# Patient Record
Sex: FEMALE | Race: WHITE
[De-identification: names, ages, dates, MRNs, and addresses within clinical notes are randomized per-mention and may not be internally consistent; named-entity substitution may affect disease eponyms.]

---

## 2017-03-20 ENCOUNTER — HOSPITAL ENCOUNTER (EMERGENCY)
Dept: HOSPITAL 25 - ED | Age: 52
LOS: 1 days | Discharge: HOME | End: 2017-03-21
Payer: COMMERCIAL

## 2017-03-20 DIAGNOSIS — H53.149: ICD-10-CM

## 2017-03-20 DIAGNOSIS — R51: Primary | ICD-10-CM

## 2017-03-20 DIAGNOSIS — F17.210: ICD-10-CM

## 2017-03-20 PROCEDURE — 96374 THER/PROPH/DIAG INJ IV PUSH: CPT

## 2017-03-20 PROCEDURE — 99282 EMERGENCY DEPT VISIT SF MDM: CPT

## 2017-03-20 PROCEDURE — 70450 CT HEAD/BRAIN W/O DYE: CPT

## 2017-03-21 VITALS — SYSTOLIC BLOOD PRESSURE: 132 MMHG | DIASTOLIC BLOOD PRESSURE: 77 MMHG

## 2017-03-21 NOTE — RAD
HISTORY: Headache, status post stroke



COMPARISONS: October 17, 2016



TECHNIQUE: Multiple contiguous axial CT scans were obtained of the head without 

intravenous contrast. 



FINDINGS: 

HEMORRHAGE/INFARCT: There is no hemorrhage or acute infarct.

MASSES/SHIFT: There is no mass or shift.



EXTRA-AXIAL SPACES: There are no extra-axial fluid collections.

SULCI AND VENTRICLES: The sulci and ventricles are normal in size and position for the

patient's stated age.



CEREBRUM: There is right frontal encephalomalacia consistent with remote infarct.

BRAINSTEM: There are no focal parenchymal abnormalities.

CEREBELLUM: There are no focal parenchymal abnormalities.



VESSELS: The vessels are grossly normal.

PARANASAL SINUSES: The paranasal sinuses are clear.

ORBITS: The orbits are unremarkable.

BONES AND SOFT TISSUE: No bone or soft tissue abnormalities are noted.



OTHER: None



IMPRESSION: 

NO ACUTE INTRACRANIAL PATHOLOGY. REMOTE INFARCT OF THE RIGHT FRONTAL LOBE .

## 2017-03-21 NOTE — ED
I, Oh,Kash, scribed for Mohsen Salvador MD on 03/21/17 at 0022 .





Headache





- HPI Summary


HPI Summary: 


This 50 y/o female presents to ED for acute on chronic HA since today AM. PMHx 

includes CVA in September 2016, and pt states that she has had similar HA since 

then. She decided to visit ED today when HA became worse and made driving and 

ambulation difficult. Pt does have an appointment set up with her neurologist 

Dr. Agarwal today. 





Plan of care involving CT imaging and outpatient f/u with Dr. Agarwal as 

scheduled is discussed with pt and family members are agreeable. 








- History Of Current Complaint


Chief Complaint: EDHeadache


Stated Complaint: HEAD PAIN


Time Seen by Provider: 03/20/17 23:23


Hx Obtained From: Patient, Medical Records


Onset/Duration: Gradual Onset, Still Present


Initially Headache Was: Moderate


Currently Pain Is: Moderate


Timing: Constant


Character: Dull


Location of Headache: Diffuse


Aggravating Factor: Bright Lights


Allevating Factors: Nothing


Associated Signs And Symptoms: Negative





- Allergies/Home Medications


Allergies/Adverse Reactions: 


 Allergies











Allergy/AdvReac Type Severity Reaction Status Date / Time


 


Hydrocodone [From Vicodin] Allergy Intermediate Nausea And Verified 03/20/17 23:

07





   Vomiting  


 


Ibuprofen Allergy Intermediate Nausea And Verified 03/20/17 23:07





   Vomiting  


 


Latex Allergy Intermediate Rash Verified 03/20/17 23:07


 


bee stings Allergy Severe Swelling Uncoded 03/20/17 23:07





   Of  





   Face,Lips,&  





   Throat  


 


seafood Allergy Severe Difficulty Uncoded 03/20/17 23:07





   Breathing  


 


cocoanut Allergy Intermediate Rash Uncoded 03/20/17 23:07














PMH/Surg Hx/FS Hx/Imm Hx


Endocrine/Hematology History: 


   Denies: Hx Anticoagulant Therapy, Hx Diabetes, Hx Thyroid Disease


Cardiovascular History: Reports: Hx Deep Vein Thrombosis - left leg, Hx 

Hypercholesterolemia, Hx Hypertension, Other Cardiovascular Problems/Disorders 

- dyslipidemia, right sided carotid artery stenosis, patent rahman oval


   Denies: Hx Pacemaker/ICD


Respiratory History: Reports: Hx Asthma, Hx Chronic Obstructive Pulmonary 

Disease (COPD)


 History: Reports: Hx Acute Renal Failure


   Denies: Hx Renal Disease


Musculoskeletal History: Reports: Hx Back Problems


Sensory History: 


   Denies: Hx Contacts or Glasses, Hx Hearing Aid


Opthamlomology History: 


   Denies: Hx Contacts or Glasses


Neurological History: Reports: Hx Migraine


   Denies: Hx Dementia, Hx Seizures, Other Neuro Impairments/Disorders - DENIES


Psychiatric History: Reports: Hx Depression


   Denies: Hx Anxiety, Hx Attention Deficit Hyperactivity Disorder, Hx Eating 

Disorder, Hx Panic Disorder, Hx Post Traumatic Stress Disorder, Hx Inpatient 

Treatment, Hx Community Mental Health Tx, Hx Schizophrenia, Hx Bipolar Disorder

, Hx Suicide Attempt - denies SA; says recent OD was a "1x thing" to get the 

attention of wife, Hx of Violent Episodes Against Others, Hx Substance Abuse, 

Other Psychiatric Issues/Disorders





- Surgical History


Surgery Procedure, Year, and Place: [facility]


Infectious Disease History: No


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 

and breast


   Denies: Hx Hepatitis, Hx Human Immunodeficiency Virus (HIV), History Other 

Infectious Disease, Traveled Outside the [place] in Last 30 Days





- Family History


Known Family History: Positive: Cardiac Disease - CHF - MOM AND DAD, Other - 

Father CVA


Family History: FHx of CVA





- Social History


Alcohol Use: None


Hx Substance Use: No


Substance Use Type: Reports: None, Other


Substance Use Comment - Amount & Last Used: cigarettes; trying to quit


Hx Tobacco Use: Yes


Smoking Status (MU): Heavy Every Day Tobacco Smoker


Type: Cigarettes


Amount Used/How Often: 1 ppd or less


Length of Time of Smoking/Using Tobacco: 37 years


Have You Smoked in the Last Year: Yes





Review of Systems


Negative: Fever


Positive: Photophobia


Positive: Headache


Negative: Depressed


All Other Systems Reviewed And Are Negative: Yes





Physical Exam


Triage Information Reviewed: Yes


Vital Signs On Initial Exam: 


 Initial Vitals











Temp Pulse Resp BP Pulse Ox


 


 97.0 F   77   15   141/89   99 


 


 03/20/17 23:00  03/20/17 23:00  03/20/17 23:00  03/20/17 23:00  03/20/17 23:00











Vital Signs Reviewed: Yes


Appearance: Positive: Well-Appearing, Pain Distress - MILD DISCOMFORT


Skin: Positive: Warm


Head/Face: Positive: Normal Head/Face Inspection


Eyes: Positive: EOMI, HANANE


ENT: Positive: Hearing grossly normal


Neck: Positive: Supple


Respiratory/Lung Sounds: Positive: Breath Sounds Present


Cardiovascular: Positive: RRR


Abdomen Description: Positive: Nontender, Soft


Musculoskeletal: Positive: Strength/ROM Intact


Psychiatric: Positive: Affect/Mood Appropriate





Diagnostics





- Vital Signs


 Vital Signs











  Temp Pulse Resp BP Pulse Ox


 


 03/20/17 23:00  97.0 F  77  15  141/89  99














- Laboratory


Lab Statement: Any lab studies that have been ordered have been reviewed, and 

results considered in the medical decision making process.





- CT


  ** Brain


CT Interpretation: No Acute Changes - Old right frontal infarct again noted. 

The remiander of the brain is normal. No hemorrahge. No visible acute infarct. 

No mass. Osseous structures are intact


CT Interpretation Completed By: Radiologist





Re-Evaluation





- Re-Evaluation


  ** First Eval


Change: Improved - RESULTS d/w pt





Headache Course/Dx





- Diagnoses


Provider Diagnoses: 


 Headache








Discharge





- Discharge Plan


Condition: Improved


Disposition: HOME


Patient Education Materials:  General Headache (ED)


Referrals: 


Crystal Veronica MD [Primary Care Provider] - 


Carlos Agarwal MD [Medical Doctor] - 03/21/17 (Please follow up with Dr. Agarwal's office as scheduled. )





The documentation as recorded by the Juan linares Soohyun accurately reflects the 

service I personally performed and the decisions made by me, Mohsen Salvador MD.

## 2017-10-13 ENCOUNTER — HOSPITAL ENCOUNTER (EMERGENCY)
Dept: HOSPITAL 25 - UCCORT | Age: 52
Discharge: HOME | End: 2017-10-13
Payer: SELF-PAY

## 2017-10-13 VITALS — DIASTOLIC BLOOD PRESSURE: 73 MMHG | SYSTOLIC BLOOD PRESSURE: 125 MMHG

## 2017-10-13 DIAGNOSIS — Z91.81: ICD-10-CM

## 2017-10-13 DIAGNOSIS — Z88.6: ICD-10-CM

## 2017-10-13 DIAGNOSIS — Z91.013: ICD-10-CM

## 2017-10-13 DIAGNOSIS — Z91.030: ICD-10-CM

## 2017-10-13 DIAGNOSIS — F17.210: ICD-10-CM

## 2017-10-13 DIAGNOSIS — S73.101A: Primary | ICD-10-CM
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
## 2017-10-13 DIAGNOSIS — Z91.040: ICD-10-CM

## 2017-10-13 DIAGNOSIS — Z88.5: ICD-10-CM

## 2017-10-13 PROCEDURE — G0463 HOSPITAL OUTPT CLINIC VISIT: HCPCS

## 2017-10-13 PROCEDURE — 99212 OFFICE O/P EST SF 10 MIN: CPT

## 2017-10-13 NOTE — UC
Hip/Pelvis Pain





- HPI Summary


HPI Summary: 





RIGHT HIP PAIN X 1WK. RADIATES TO (ANTERIOR) THIGH. HAS BEEN FALLING X 3 , WITH 

INJURY, BUT NO KNOWN INITIAL INJURY. EACH TIME HIP PAIN GETS WORSE. 





- History Of Current Complaint


Chief Complaint: UCLowerExtremity


Stated Complaint: RIGHT HIP AND LEG PAIN


Time Seen by Provider: 10/13/17 17:22


Hx Obtained From: Patient


Hx Last Menstrual Period: April 15


Onset/Duration: Gradual Onset, Lasting Days


Timing: Intermittent Episodes Lasting:


Severity Initially: Mild


Severity Currently: Moderate


Location: Discrete At: - RIGHT HIP


Character Of Pain: Dull, Aching, Spasmodic


Aggravating Factor(s): Movement, Weight Bearing


Alleviating Factor(s): Rest, Position





- Allergies/Home Medications


Allergies/Adverse Reactions: 


 Allergies











Allergy/AdvReac Type Severity Reaction Status Date / Time


 


Hydrocodone [From Vicodin] Allergy Intermediate Nausea And Verified 10/13/17 17:

25





   Vomiting  


 


Ibuprofen Allergy Intermediate Nausea And Verified 10/13/17 17:25





   Vomiting  


 


Latex Allergy Intermediate Rash Verified 10/13/17 17:25


 


bee stings Allergy Severe Swelling Uncoded 10/13/17 17:25





   Of  





   Face,Lips,&  





   Throat  


 


seafood Allergy Severe Difficulty Uncoded 10/13/17 17:25





   Breathing  


 


cocoanut Allergy Intermediate Rash Uncoded 10/13/17 17:25














PMH/Surg Hx/FS Hx/Imm Hx


Previously Healthy: Yes


Other History Of: 


   Negative For: Anticoagulant Therapy





- Surgical History


Surgical History: Yes


Surgery Procedure, Year, and Place: CARODED ARTERY  SURGERY





- Family History


Known Family History: Positive: Cardiac Disease - CHF - MOM AND DAD, Other - 

Father CVA


Family History: FHx of CVA





- Social History


Alcohol Use: None


Substance Use Type: None


Substance Use Comment - Amount & Last Used: cigarettes; trying to quit


Smoking Status (MU): Light Every Day Tobacco Smoker


Type: Cigarettes


Amount Used/How Often: 3 CIGS A DAY


Length of Time of Smoking/Using Tobacco: 37 years


Have You Smoked in the Last Year: Yes


Household Exposure Type: Cigarettes





- Immunization History


Most Recent Influenza Vaccination: not this season


Most Recent Tetanus Shot: utd


Most Recent Pneumonia Vaccination: 2014





Review of Systems


Constitutional: Negative


Skin: Negative


Eyes: Negative


ENT: Negative


Respiratory: Negative


Cardiovascular: Negative


Gastrointestinal: Negative


Genitourinary: Negative


Motor: Negative


Neurovascular: Negative


Musculoskeletal: Arthralgia, Myalgia


Neurological: Negative


Psychological: Negative


Is Patient Immunocompromised?: No


All Other Systems Reviewed And Are Negative: Yes





Physical Exam


Triage Information Reviewed: Yes


Appearance: Well-Appearing, No Pain Distress, Well-Nourished


Vital Signs: 


 Initial Vital Signs











Temp  97.7 F   10/13/17 17:28


 


Pulse  88   10/13/17 17:28


 


Resp  20   10/13/17 17:28


 


BP  125/73   10/13/17 17:28


 


Pulse Ox  100   10/13/17 17:28











Vital Signs Reviewed: Yes


Eye Exam: Normal


ENT Exam: Normal


Dental Exam: Normal


Neck exam: Normal


Neck: Positive: Supple, Nontender, No Lymphadenopathy


Respiratory Exam: Normal


Respiratory: Positive: Chest non-tender, Lungs clear, Normal breath sounds, No 

respiratory distress, No accessory muscle use


Cardiovascular Exam: Normal


Cardiovascular: Positive: RRR, No Murmur, Pulses Normal, Brisk Capillary Refill


Abdominal Exam: Normal


Musculoskeletal: Positive: Strength Intact, ROM Intact, No Edema, Other: - 

tender to palpation of superior trochanter right hip


Neurological Exam: Normal


Psychological Exam: Normal


Skin Exam: Normal





Hip Injury Course/Dx





- Differential Dx/Diagnosis


Differential Diagnosis/HQI/PQRI: Bursitis, Fracture, Sprain, Strain


Provider Diagnoses: RIGHT HIP SPRAIN





Discharge





- Discharge Plan


Condition: Stable


Disposition: HOME


Prescriptions: 


Metaxalone TAB* [Skelaxin TAB*] 800 mg PO TID PRN #15 tab


 PRN Reason: Spasms


traMADol TAB* [Ultram*] 50 mg PO Q8H PRN #15 tab MDD three tabs


 PRN Reason: Pain


Patient Education Materials:  Hip Sprain (ED)


Referrals: 


[facility] [place] [facility] [Outside]


Non Staff,Doctor [Primary Care Provider] - 


Crystal Veronica MD [Medical Doctor] - 


Additional Instructions: 


PHYSICAL THERAPY REFERRAL:


You have been prescribed physical therapy. Treatments may include stretching, 

exercise, application of heat or cold, and other modalities. After an injury, 

PT can reduce swelling and pain. In recovery, PT is used to restore mobility 

and strength. Your specific treatment goals are:


_____ Reduction of Swelling (EGS, US, ice as needed)


___x__ Pain Reduction (EGS, US, ice as needed)


___x__ TENS Pack Fitting and Instruction


_____ Wound Hydrotherapy


__x___ Preservation of Mobility


___x__ Restoration of Mobility


___x__ Strength Restoration


___x__ Work or Sports Hardening


This instruction sheet also serves as your PHYSICAL THERAPY REFERRAL! 


Please take it with you to the therapist, so he/she will be aware of your 

diagnosis and treatment plan.


You may see the physical therapist of your choice for these treatments, but may 

wish to check with your insurance to be sure the provider you select is covered.


It's important to see the doctor to whom you have been referred for follow up.

## 2017-10-13 NOTE — RAD
Indication: RIGHT lateral hip pain for one week which radiates to the medial thigh.

Multiple falls this week. Difficulty weightbearing.



Comparison: August 12, 2016



Technique: Supine AP pelvis and AP and frog-leg lateral views RIGHT hip.



Report: The RIGHT hip is normally located. No fracture or stress reaction evident.

Preserved hip joint spaces. Negative for pelvic fracture or joint diastases. Unremarkable

soft tissue contours accounting for obese body habitus. Inferior vena cava filter

partially visualized extending beyond the cephalad margin of the field-of-view.



IMPRESSION: No radiographic abnormality of the RIGHT hip evident.

## 2017-11-06 ENCOUNTER — HOSPITAL ENCOUNTER (EMERGENCY)
Dept: HOSPITAL 25 - UCEAST | Age: 52
Discharge: HOME | End: 2017-11-06
Payer: MEDICAID

## 2017-11-06 VITALS — DIASTOLIC BLOOD PRESSURE: 74 MMHG | SYSTOLIC BLOOD PRESSURE: 142 MMHG

## 2017-11-06 DIAGNOSIS — F17.210: ICD-10-CM

## 2017-11-06 DIAGNOSIS — I10: ICD-10-CM

## 2017-11-06 DIAGNOSIS — Z88.6: ICD-10-CM

## 2017-11-06 DIAGNOSIS — Z79.01: ICD-10-CM

## 2017-11-06 DIAGNOSIS — J45.909: ICD-10-CM

## 2017-11-06 DIAGNOSIS — Z88.5: ICD-10-CM
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
## 2017-11-06 DIAGNOSIS — S60.351A: Primary | ICD-10-CM

## 2017-11-06 DIAGNOSIS — Z23: ICD-10-CM

## 2017-11-06 PROCEDURE — 90715 TDAP VACCINE 7 YRS/> IM: CPT

## 2017-11-06 PROCEDURE — 90471 IMMUNIZATION ADMIN: CPT

## 2017-11-06 PROCEDURE — 99212 OFFICE O/P EST SF 10 MIN: CPT

## 2017-11-06 PROCEDURE — G0463 HOSPITAL OUTPT CLINIC VISIT: HCPCS

## 2017-11-06 NOTE — RAD
INDICATION:  Foreign body right thumb.



TECHNIQUE: 3 views of the right thumb were obtained.



FINDINGS:  The bones are in normal alignment. No fracture is seen. There is a linear

metallic foreign body which projects over the soft tissues at the level of the

interphalangeal joint of the thumb along the medial aspect measuring 12 mm in length by 1

mm in size with a bend in the middle.



IMPRESSION:  METALLIC FOREIGN BODY AS DESCRIBED.

## 2017-11-06 NOTE — UC
Valerie NOEL Edward, scribed for Russell Morrow MD on 11/06/17 at 1558 .





Hand/Wrist HPI





- HPI Summary


HPI Summary: 





51 y/o female presents to [facility] c/o immediate onset R hand pain and possible FB 

in R hand s/p hand injury yesterday. The pain and site of injury is located @ 

the R thumb and is aggravated with movement of the hand. The pain is rated 7/10 

described as an ache at triage. Yesterday the pt slipped off a step and her 

hand slid down a wooden rail. Pt states she pulled out a small splinter today 

and is worried there might still be something in her R hand. PMHx HTN, asthma. 

Smoker. Sx carotid artery. FHx - mom had COPD and CHF.





- History Of Current Complaint


Chief Complaint: UCForeignBody


Stated Complaint: F.O. IN THUMB


Time Seen by Provider: 11/06/17 15:51


Hx Obtained From: Patient


Hx Last Menstrual Period: April 15


Onset/Duration: Sudden Onset, Lasting Days, Still Present


Severity Currently: Severe


Aggravating Factor(s): Movement - of R hand


Alleviating Factor(s): Nothing


Associated Signs And Symptoms: Positive: Other - FB in R hand @ R thumb





- Allergies/Home Medications


Allergies/Adverse Reactions: 


 Allergies











Allergy/AdvReac Type Severity Reaction Status Date / Time


 


Hydrocodone [From Vicodin] Allergy Intermediate Nausea And Verified 11/06/17 15:

37





   Vomiting  


 


Ibuprofen Allergy Intermediate Nausea And Verified 11/06/17 15:37





   Vomiting  


 


Latex Allergy Intermediate Rash Verified 11/06/17 15:37


 


bee stings Allergy Severe Swelling Uncoded 11/06/17 15:37





   Of  





   Face,Lips,&  





   Throat  


 


seafood Allergy Severe Difficulty Uncoded 11/06/17 15:37





   Breathing  


 


cocoanut Allergy Intermediate Rash Uncoded 11/06/17 15:37











Home Medications: 


 Home Medications





Rivaroxaban TAB(*) [Xarelto 15 mg(*)] 15 mg PO BID 11/06/17 [History Confirmed 

11/06/17]











PMH/Surg Hx/FS Hx/Imm Hx


Previously Healthy: No


Cardiovascular History: Hypertension


Respiratory History: Asthma


Other History Of: 


   Negative For: Anticoagulant Therapy





- Surgical History


Surgical History: Yes


Surgery Procedure, Year, and Place: CARODID ARTERY  SURGERY.  Vena Cava filter





- Family History


Known Family History: Positive: Cardiac Disease - CHF - MOM AND DAD, Other - 

Father CVA


Family History: FHx of CVA





- Social History


Alcohol Use: None


Substance Use Type: None


Substance Use Comment - Amount & Last Used: cigarettes; trying to quit


Smoking Status (MU): Light Every Day Tobacco Smoker


Type: Cigarettes


Amount Used/How Often: 4 CIGS A DAY


Length of Time of Smoking/Using Tobacco: 37 years


Have You Smoked in the Last Year: Yes


Household Exposure Type: Cigarettes





- Immunization History


Most Recent Influenza Vaccination: none


Most Recent Tetanus Shot: unknown


Most Recent Pneumonia Vaccination: 2014





Review of Systems


Constitutional: Negative


Skin: Other - possible FB @ R thumb


Eyes: Negative


ENT: Negative


Respiratory: Negative


Cardiovascular: Negative


Gastrointestinal: Negative


Genitourinary: Negative


Motor: Negative


Neurovascular: Negative


Musculoskeletal: Arthralgia - R hand pain s/p injury @ R thumb


Neurological: Negative


Psychological: Negative


All Other Systems Reviewed And Are Negative: Yes





Physical Exam


Triage Information Reviewed: Yes


Appearance: Well-Appearing, No Pain Distress


Vital Signs: 


 Initial Vital Signs











Temp  97.6 F   11/06/17 15:30


 


Pulse  73   11/06/17 15:30


 


Resp  16   11/06/17 15:30


 


BP  142/74   11/06/17 15:30


 


Pulse Ox  99   11/06/17 15:30











Vital Signs Reviewed: Yes


Eye Exam: Normal


ENT Exam: Normal


Neck: Positive: Supple, Nontender


Respiratory: Positive: Lungs clear, Normal breath sounds


Cardiovascular: Positive: RRR, No Murmur


Abdomen Description: Positive: Nontender


Musculoskeletal: Positive: ROM Intact, Other: - right thumb with puncture wound 

but no FB palpated, and non seen.  She is slightly tender over the right 

lateral thumb without any fluctuance, fb on palpation or inspection.  There is 

faint localized redness.


Psychological: Positive: Normal Response To Family


Skin: Negative: rashes





Diagnostics





- Radiology


  ** THUMB XR


Xray Interpretation: Positive (See Comments) - METALLIC FOREIGN BODY AS 

DESCRIBED.


Radiology Interpretation Completed By: Radiologist





Re-Evaluation





- Re-Evaluation


  ** 1


Re-Evaluation Time: 16:45


Comment: Review imaging results





Hand/Wrist Course/Dx





- Course


Course Of Treatment: 51 y/o female presents to [facility] c/o immediate onset R hand 

pain and possible FB in R hand s/p hand injury yesterday. The pain and site of 

injury is located @ the R thumb and is aggravated with movement of the hand. 

The pain is rated 7/10 described as an ache at triage. Yesterday the pt slipped 

off a step and her hand slid down a wooden rail. Pt states she pulled out a 

small splinter today and is worried there might still be something in her R 

hand. PMHx HTN, asthma. Smoker. Sx carotid artery. FHx - mom had COPD and CHF. 

THUMB XR SHOWS METALLIC FOREIGN BODY AS DESCRIBED. The patient does not have a 

palpable FB on exam on the thumb, and it is near digital nerves/artery.  I have 

called Dr Fitz Messer office and the  asked me to have the patient 

call them for appointment tomorrow.   Dr Messer is on call today for ortho.   

The patient will be put on keflex.





- Differential Dx/Diagnosis


Provider Diagnoses: metallic foreign body right thumb





Discharge





- Discharge Plan


Condition: Good


Disposition: HOME


Prescriptions: 


Cephalexin CAP* [Keflex CAP*] 500 mg PO TID #30 cap


Patient Education Materials:  Soft Tissue Foreign Body (ED), Hypertension (ED)


Referrals: 


VENKATA Claudio [Primary Care Provider] - 


Fitz Messer MD [Medical Doctor] - 1 Day





The documentation as recorded by the Valerie linares Edward accurately reflects the 

service I personally performed and the decisions made by me, Russell Morrow MD.

## 2017-11-09 NOTE — HP
PREOPERATIVE HISTORY AND PHYSICAL:

 

DATE OF SURGERY/ADMISSION:  11/14/17

 

DATE OF OFFICE VISIT/ENCOUNTER:  11/08/17

 

ATTENDING SURGEON:  Nena Ordonez MD * (DICTATED BY BAIRON STARK)

 

PROCEDURE:  Right thumb incision and drainage, removal of foreign body.

 

CHIEF COMPLAINT:  Foreign body, right thumb.

 

HISTORY OF PRESENT ILLNESS:  This is a 52-year-old female who has a foreign 
body in her right thumb.  She sustained injury on 11/06/17.  She was going down 
the stairs and was sliding her hand on the handrail and she got punctured with 
a piece of metal into her right thumb.  She was initially seen at urgent care 
where x-rays showed a metallic foreign body in her thumb.  She was placed on 
Keflex and referred to Dr. Ordonez for further evaluation and treatment.  The 
patient is interested in having the foreign body removed and has consented to 
proceed with surgery in the form of a right thumb incision and drainage, 
removal of foreign body.

 

PAST MEDICAL HISTORY:

1.  History of DVT, right leg, which was found approximately 2 weeks ago.  The 
patient is on Xarelto.

2.  History of stroke x2 in October 2016.

3.  Hypercholesterolemia.

4.  Hypertension.

 

PAST SURGICAL HISTORY:

1.  Carotid endarterectomy 1 year ago.

2.  Filter placement.

 

CURRENT MEDICATIONS:

1.  Amlodipine besylate 5 mg daily.

2.  Atorvastatin calcium 10 mg daily.

3.  B complex daily.

4.  Diclofenac sodium 1% apply 1 to 2 g 3 to 4 days p.r.n.

5.  Topiramate 25 mg b.i.d.

6.  Vitamin D 1000 units daily.

7.  Xarelto one 15 mg tab b.i.d.

 

ALLERGIES:  IBUPROFEN and VICODIN caused nausea and vomiting and also allergy 
to LATEX.

 

FAMILY MEDICAL HISTORY:  Congestive heart failure and COPD.

 

SOCIAL HISTORY:  The patient is on [employer] disability.  She is a current smoker.  
She is trying to quit and says she is down to 4 cigarettes per day.  Previously
, she has been up to a pack and a half per day and has smoked for 30+ years.  
She denies recreational drug use and does not drink alcohol.

 

REVIEW OF SYSTEMS:  General:  Negative for fevers, chills, or night sweats.  No 
known anesthesia problems.  HEENT:  Negative for headache, lightheadedness, or 
syncopal episodes.  Integumentary:  Negative for abrasions, lesions, or open 
wounds.  Cardiothoracic: Positive for hypertension. Negative for chest pain, 
palpitations, or edema.  Pulmonary: Negative for shortness of breath with 
exertion, chronic cough, COPD.  GI:  Negative for nausea, vomiting, diarrhea, 
constipation, or GERD.  :  Negative for nocturia, urinary frequency, urgency, 
history of UTIs, or kidney problems.  Musculoskeletal:  Positive for current 
complaint.  Positive for chronic back pain.  Neurologic:  Negative for 
paresthesias, numbness.  Positive for history of stroke.  Negative for 
seizures. Endocrine:  Negative for diabetes or thyroid issues.  Hematologic:  
Positive for history of DVT.  Negative for bleeding disorders.  Infectious 
Disease:  Positive for history of MRSA approximately 4 years ago.  Negative for 
hepatitis C or HIV.

 

                               PHYSICAL EXAMINATION

 

GENERAL:  Well-developed, well-nourished 52-year-old female in no acute 
distress.

 

VITAL SIGNS:  Height 5 feet 4 inches, weight 230 pounds, pulse rate 74, blood 
pressure 129/75.

 

HEENT:  Normocephalic, atraumatic.  Pupils are equal, round, and reactive to 
light and accommodation.  Extraocular movements are intact.  Throat is clear.

 

NECK:  Supple.  No palpable lymph nodes.

 

PULMONARY:  Lungs are clear to auscultation bilaterally.  No wheezes, rales, or 
rhonchi.

 

CARDIOVASCULAR:  Regular rate and rhythm.  S1, S2.  No murmurs, rubs, or 
gallops. No edema.

 

ABDOMEN:  Positive bowel sounds, soft, nontender.

 

NEUROLOGICAL:  Alert and oriented x3.  Cranial nerves II through XII are 
intact. Sensation is intact to light touch.

 

MUSCULOSKELETAL:  On exam of her right thumb, she has a sealed puncture wound 
with some surrounding mild erythema.  There is good movement in flexion and 
extension of her thumb IP joint.  She has intact neurovascular function.

 

SKIN:  Intact.

 

 IMAGING STUDIES:  X-rays show a metallic foreign body in right thumb.

 

ASSESSMENT:  Right thumb foreign body.

 

PLAN:  The patient is scheduled to undergo a right thumb incision and drainage 
and removal of foreign body with Dr. Ordonez on 11/14/17.  She will return to the 
office in 10 to 14 days postop for followup and a prescription for Ultracet was 
e-scribed to the patient's pharmacy for postoperative pain management.

 

 ____________________________________ BAIRON STARK

 

[id number] #: [id number]

[facility]

## 2017-11-14 ENCOUNTER — HOSPITAL ENCOUNTER (OUTPATIENT)
Dept: HOSPITAL 25 - OREAST | Age: 52
Discharge: HOME | End: 2017-11-14
Attending: ORTHOPAEDIC SURGERY
Payer: MEDICAID

## 2017-11-14 VITALS — DIASTOLIC BLOOD PRESSURE: 60 MMHG | SYSTOLIC BLOOD PRESSURE: 115 MMHG
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
## 2017-11-14 DIAGNOSIS — Z86.73: ICD-10-CM

## 2017-11-14 DIAGNOSIS — Z79.01: ICD-10-CM
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
## 2017-11-14 DIAGNOSIS — S61.041A: Primary | ICD-10-CM

## 2017-11-14 DIAGNOSIS — F17.210: ICD-10-CM

## 2017-11-14 DIAGNOSIS — I10: ICD-10-CM

## 2017-11-14 DIAGNOSIS — Z86.718: ICD-10-CM

## 2017-11-14 DIAGNOSIS — L03.011: ICD-10-CM

## 2017-11-14 PROCEDURE — 87205 SMEAR GRAM STAIN: CPT

## 2017-11-14 PROCEDURE — 87073 CULTURE BACTERIA ANAEROBIC: CPT

## 2017-11-14 PROCEDURE — 87070 CULTURE OTHR SPECIMN AEROBIC: CPT

## 2017-11-15 NOTE — OP
DATE OF OPERATION:  11/14/17 [facility]

 

DATE OF BIRTH:  [birth year]

 

SURGEON:  Nena Ordonez MD.

 

ASSISTANT:  BAIRON Cho.

 

ANESTHESIOLOGIST:  Abelardo Medina DO



ANESTHESIA:  Local, MAC.

 

PRE-OP DIAGNOSIS:  Foreign body of the right thumb with infection.

 

POST-OP DIAGNOSIS:  Foreign body of the right thumb with infection.

 

OPERATIVE PROCEDURE:  I and D of the right thumb, with removal of foreign body.

 

ESTIMATED BLOOD LOSS:  Zero.

 

TOURNIQUET TIME:  About 10 minutes.

 

INDICATIONS FOR PROCEDURE:  Mckenna is a 52-year-old woman who suffered a 
puncture wound in her right thumb with a piece of metal that broke off a 
railing.  The puncture has sealed and the metallic foreign body remains in her 
thumb.  She has swelling, redness, and drainage.  Presents for I and D of the 
right thumb with foreign body removal.

 

DESCRIPTION OF PROCEDURE:  The patient was brought to the operating room, was 
given a sedation anesthetic and a local infiltration of 10 cc of 1% plain 
lidocaine as a digital block to the right thumb. The skin of her right hand and 
forearm was prepped and draped in the usual sterile fashion.  The hand and 
forearm was exsanguinated and tourniquet elevated to 250 mmHg.  A stab incision 
was made at the puncture site and there was purulent drainage, so this was 
cultured.  The metallic foreign body was then easily removed and the wound was 
copiously irrigated with saline.  The wound was loosely closed with 4-0 nylon 
suture, dressed with Xeroform, 4x4, Webril, and Coban.  The patient tolerated 
the procedure well and was brought to the recovery room in good condition.

 

 [id number]/CPS #: [id number]

[facility]

## 2017-12-12 ENCOUNTER — HOSPITAL ENCOUNTER (EMERGENCY)
Dept: HOSPITAL 25 - ED | Age: 52
Discharge: HOME | End: 2017-12-12
Payer: COMMERCIAL

## 2017-12-12 VITALS — DIASTOLIC BLOOD PRESSURE: 83 MMHG | SYSTOLIC BLOOD PRESSURE: 142 MMHG

## 2017-12-12 DIAGNOSIS — T40.601A: Primary | ICD-10-CM
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
## 2017-12-12 DIAGNOSIS — E78.00: ICD-10-CM

## 2017-12-12 DIAGNOSIS — I10: ICD-10-CM

## 2017-12-12 DIAGNOSIS — J44.9: ICD-10-CM

## 2017-12-12 DIAGNOSIS — I69.920: ICD-10-CM

## 2017-12-12 DIAGNOSIS — K21.9: ICD-10-CM

## 2017-12-12 DIAGNOSIS — Z86.718: ICD-10-CM

## 2017-12-12 DIAGNOSIS — J45.909: ICD-10-CM

## 2017-12-12 DIAGNOSIS — I69.354: ICD-10-CM

## 2017-12-12 DIAGNOSIS — F17.210: ICD-10-CM

## 2017-12-12 LAB
ALBUMIN SERPL BCG-MCNC: 4.1 G/DL (ref 3.2–5.2)
ALP SERPL-CCNC: 71 U/L (ref 34–104)
ALT SERPL W P-5'-P-CCNC: 13 U/L (ref 7–52)
ANION GAP SERPL CALC-SCNC: 9 MMOL/L (ref 2–11)
AST SERPL-CCNC: 13 U/L (ref 13–39)
BUN SERPL-MCNC: 16 MG/DL (ref 6–24)
BUN/CREAT SERPL: 12.7 (ref 8–20)
CALCIUM SERPL-MCNC: 9.2 MG/DL (ref 8.6–10.3)
CHLORIDE SERPL-SCNC: 109 MMOL/L (ref 101–111)
CHOLEST SERPL-MCNC: 266 MG/DL
GLOBULIN SER CALC-MCNC: 3.1 G/DL (ref 2–4)
GLUCOSE SERPL-MCNC: 148 MG/DL (ref 70–100)
HCO3 SERPL-SCNC: 17 MMOL/L (ref 22–32)
HCT VFR BLD AUTO: 44 % (ref 35–47)
HDLC SERPL-MCNC: 33 MG/DL
HGB BLD-MCNC: 14.5 G/DL (ref 12–16)
MCH RBC QN AUTO: 30 PG (ref 27–31)
MCHC RBC AUTO-ENTMCNC: 33 G/DL (ref 31–36)
MCV RBC AUTO: 91 FL (ref 80–97)
POTASSIUM SERPL-SCNC: 3.7 MMOL/L (ref 3.5–5)
PROT SERPL-MCNC: 7.2 G/DL (ref 6.4–8.9)
RBC # BLD AUTO: 4.8 10^6/UL (ref 4–5.4)
SODIUM SERPL-SCNC: 135 MMOL/L (ref 133–145)
TRIGL SERPL-MCNC: 317 MG/DL
TROPONIN I SERPL-MCNC: 0.01 NG/ML (ref ?–0.04)
WBC # BLD AUTO: 11.3 10^3/UL (ref 3.5–10.8)

## 2017-12-12 PROCEDURE — 83605 ASSAY OF LACTIC ACID: CPT

## 2017-12-12 PROCEDURE — 70450 CT HEAD/BRAIN W/O DYE: CPT

## 2017-12-12 PROCEDURE — 36415 COLL VENOUS BLD VENIPUNCTURE: CPT

## 2017-12-12 PROCEDURE — 99284 EMERGENCY DEPT VISIT MOD MDM: CPT

## 2017-12-12 PROCEDURE — 85610 PROTHROMBIN TIME: CPT

## 2017-12-12 PROCEDURE — 80061 LIPID PANEL: CPT

## 2017-12-12 PROCEDURE — 93005 ELECTROCARDIOGRAM TRACING: CPT

## 2017-12-12 PROCEDURE — 85730 THROMBOPLASTIN TIME PARTIAL: CPT

## 2017-12-12 PROCEDURE — 85025 COMPLETE CBC W/AUTO DIFF WBC: CPT

## 2017-12-12 PROCEDURE — 71010: CPT

## 2017-12-12 PROCEDURE — 84484 ASSAY OF TROPONIN QUANT: CPT

## 2017-12-12 PROCEDURE — 80053 COMPREHEN METABOLIC PANEL: CPT

## 2017-12-12 NOTE — ED
Bettye NOEL Rebecca, scribed for Rehan Arriola MD on 12/12/17 at 0207 .





Altered Mental Status





- HPI Summary


HPI Summary: 


Pt is a 51 y/o F BIBA who presents to the ED due to increased weakness, 

decreased responsiveness and impaired speech. The pts wife came home from work 

at approximately 2200 when she noticed increased weakness and increasingly 

impaired speech. When she spoke to her daughter, she learned the pt had been 

experiencing symptoms since 1800/1900 tonight. Per EMS, she had been having 

slight difficulties speaking upon their arrival, speaking slowly, though able 

to walk herself to the stretcher. While en route, the aphasia worsened over the 

20 minute drive, initially having limited conversation, then ceasing to 

converse with EMS. EMS additionally note episodes of her eyes rolling back 

while en route with decreased responsiveness. Denies any drug use. No PMHx 

seizures. PMHx strokes x2 resulting in chronic L-sided weakness and mild 

aphasia.








- History Of Current Complaint


Chief Complaint: EDAltMentalStatus


Stated Complaint: POSS STROKE


Time Seen by Provider: 12/12/17 01:36


Hx Obtained From: Patient, EMS


Hx Last Menstrual Period: April 15


Onset/Duration: Still Present


Character: Responsiveness


Aggravating Factor(s): Nothing


Alleviating Factor(s): Nothing


Associated Signs And Symptoms: Positive: Weakness





- Allergies/Home Medications


Allergies/Adverse Reactions: 


 Allergies











Allergy/AdvReac Type Severity Reaction Status Date / Time


 


Hydrocodone [From Vicodin] Allergy Intermediate Nausea And Verified 11/14/17 12:

27





   Vomiting  


 


Ibuprofen Allergy Intermediate Nausea And Verified 11/14/17 12:27





   Vomiting  


 


Latex Allergy Intermediate Rash Verified 11/14/17 12:27


 


bee stings Allergy Severe Swelling Uncoded 11/14/17 12:27





   Of  





   Face,Lips,&  





   Throat  


 


seafood Allergy Severe Difficulty Uncoded 11/14/17 12:27





   Breathing  


 


cocoanut Allergy Intermediate Rash Uncoded 11/14/17 12:27














PMH/Surg Hx/FS Hx/Imm Hx


Endocrine/Hematology History: 


   Denies: Hx Anticoagulant Therapy, Hx Diabetes, Hx Thyroid Disease


Cardiovascular History: Reports: Hx Deep Vein Thrombosis - left leg, Hx 

Hypercholesterolemia, Hx Hypertension, Other Cardiovascular Problems/Disorders 

- hypercholesterolemia, right sided carotid artery stenosis, IVC filter


   Denies: Hx Pacemaker/ICD


Respiratory History: Reports: Hx Asthma - prn inhalers, Hx Chronic Obstructive 

Pulmonary Disease (COPD)


GI History: Reports: Hx Gastroesophageal Reflux Disease


 History: Reports: Hx Acute Renal Failure


   Denies: Hx Renal Disease


Musculoskeletal History: Reports: Hx Arthritis - left knee, Hx Back Problems, 

Other Musculoskeletal History - right thumb foreign body


Sensory History: Reports: Hx Contacts or Glasses - readers


   Denies: Hx Hearing Aid


Opthamlomology History: Reports: Hx Contacts or Glasses - readers


Neurological History: Reports: Hx Migraine, Other Neuro Impairments/Disorders - 

Stroke


   Denies: Hx Dementia, Hx Seizures


Psychiatric History: Reports: Hx Depression


   Denies: Hx Anxiety, Hx Attention Deficit Hyperactivity Disorder, Hx Eating 

Disorder, Hx Panic Disorder, Hx Post Traumatic Stress Disorder, Hx Inpatient 

Treatment, Hx Community Mental Health Tx, Hx Schizophrenia, Hx Bipolar Disorder

, Hx Suicide Attempt - denies SA; says recent OD was a "1x thing" to get the 

attention of wife, Hx of Violent Episodes Against Others, Hx Substance Abuse, 

Other Psychiatric Issues/Disorders





- Surgical History


Surgery Procedure, Year, and Place: carotid endarterectomy [facility] -2016.  IVC 

placement [facility] - 2016


Hx Anesthesia Reactions: No





- Immunization History


Date of Tetanus Vaccine: 11/6/2017


Infectious Disease History: No


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 

and breast


   Denies: Hx Hepatitis, Hx Human Immunodeficiency Virus (HIV), History Other 

Infectious Disease, Traveled Outside the [place] in Last 30 Days





- Family History


Known Family History: Positive: Cardiac Disease - CHF - MOM AND DAD, Other - 

Father CVA


Family History: FHx of CVA





- Social History


Alcohol Use: None


Alcohol Amount: reports last had alcohol many years ago


Hx Substance Use: No


Substance Use Type: Reports: None


Substance Use Comment - Amount & Last Used: cigarettes; trying to quit


Hx Tobacco Use: Yes


Smoking Status (MU): Light Every Day Tobacco Smoker


Type: Cigarettes


Amount Used/How Often: 1 1/2 ppd for 30 years, reports only 4 cigs per day now


Length of Time of Smoking/Using Tobacco: 37 years


Have You Smoked in the Last Year: Yes





Review of Systems


Negative: Fever


Neurological: Other - Decreased responsiveness, impaired speech (slurred aphasia

)


Positive: Weakness


All Other Systems Reviewed And Are Negative: Yes





Physical Exam





- Summary


Physical Exam Summary: 


Initial exam on stretcher: 


VITAL SIGNS: Reviewed.


GENERAL: ~Patient is a well-developed and nourished female who is lying 

comfortable in the stretcher. Patient is not in any acute respiratory distress.


HEAD AND FACE: No signs of trauma. No ecchymosis, hematomas or skull 

depressions. No sinus tenderness.


EYES: EOMI x 2, No injected conjunctiva, no nystagmus.


EARS: Hearing grossly intact. Ear canals and tympanic membranes are within 

normal limits.


MOUTH: Oropharynx within normal limits.


CHEST: Symmetric, no tenderness at palpation


LUNGS: Clear to auscultation bilaterally. No wheezing or crackles.


CVS: Regular rate and rhythm, S1 and S2 present, no murmurs or gallops 

appreciated.


NEURO:  Lethargic, difficulty speaking, having difficulty getting words out so 

she was rushed to CT


EXTREMITIES: L-sided hemiparesis


SKIN: Dry and warm





After CT:


GENERAL: ~Patient is a well-developed and nourished female who is lying 

comfortable in the stretcher. Patient is not in any acute respiratory distress.


HEAD AND FACE: No signs of trauma. No ecchymosis, hematomas or skull 

depressions. No sinus tenderness.


EYES: Pinpoint pupils, EOMI x 2, No injected conjunctiva, no nystagmus.


EARS: Hearing grossly intact. Ear canals and tympanic membranes are within 

normal limits.


MOUTH: Oropharynx within normal limits.


NECK: Supple, trachea is midline, no adenopathy, no JVD, no carotid bruit, no c-

spine tenderness, neck with full ROM.


CHEST: Symmetric, no tenderness at palpation


LUNGS: Clear to auscultation bilaterally. No wheezing or crackles. The 

respiratory rate is 8-10 per minute.


CVS: Regular rate and rhythm, S1 and S2 present, no murmurs or gallops 

appreciated.


EXTREMITIES: L-sided hemiparesis


SKIN: Dry and warm


Triage Information Reviewed: Yes


Vital Signs On Initial Exam: 


 Initial Vitals











Temp Pulse Resp BP Pulse Ox


 


 97.1 F   115   10   151/91   88 


 


 12/12/17 01:53  12/12/17 01:53  12/12/17 01:53  12/12/17 01:53  12/12/17 01:53











Vital Signs Reviewed: Yes





Diagnostics





- Vital Signs


 Vital Signs











  Temp Pulse Resp BP Pulse Ox


 


 12/12/17 01:53  97.1 F  115  10  151/91  88














- Laboratory


Result Diagrams: 


 12/12/17 02:13





 12/12/17 02:13


Lab Statement: Any lab studies that have been ordered have been reviewed, and 

results considered in the medical decision making process.





- Radiology


  ** CXR


Xray Interpretation: No Acute Changes


Radiology Interpretation Completed By: ED Physician





- CT


  ** Brain CT


CT Interpretation Completed By: Radiologist - Old infarctoin with a nerrow 

encephalomalacia seen in the right psoterior frontal lobe. Small focal cortical 

encephalomalacia in the right occipital lobe. There is no CT evidence of acute 

cortical territorial infarction, bleed, mass lesion, mass effect, hydrocephalus

, or abnormal extraaxial collection. Consider a follow-up brain MRI for acute 

stroke, if clinically indicated. No acute sinusitis or mastoiditis is 

identified. No acute skull fracture of calvarial lesion is noted. Dr. Arriola 

reviewed this radiology report.





- EKG


  ** 0221


Cardiac Rate: NL


EKG Rhythm: Sinus Rhythm


EKG Interpretation: 85 bpm, normal axis, normal interval, no ischemic changes





Re-Evaluation





- Re-Evaluation


  ** First Eval


Re-Evaluation Time: 03:19


Change: Improved


Comment: Pt's sx have resolved, she is now back to baseline. Discussed D/C plan.





Altered Mental Statu Course/Dx





- Course


Assessment/Plan: On initial exam on the stretcher, the pt was lethargic, having 

difficulty speaking and having difficulty getting words out, so she was rushed 

to CT. Exam of pt after CT, the pt has pinpoint pupils and her respiratory is 8-

10 per minute. She was given 2 mg of Narcan and she immediately repsonded, 

waking up and returning to her baseline. CXR, brain CT and EKG reveal no acute 

findings. She will be D/C to home with Dx of opiate overdose. She understands 

and agrees. Allergies noted. Medications reviewed. Elevated BP noted.





- Diagnoses


Discharge Diagnoses: 


 Opiate overdose








Discharge





- Discharge Plan


Condition: Stable


Disposition: HOME


Patient Education Materials:  Narcotic Abuse (ED)


Referrals: 


VENKATA Claudio [Primary Care Provider] - 


Additional Instructions: 


RETURN TO EMERGENCY DEPARTMENT FOR ANY NEW OR WORSENING SYMPTOMS





The documentation as recorded by the Bettye linares Rebecca accurately 

reflects the service I personally performed and the decisions made by me, Rehan Arriola MD.

## 2017-12-12 NOTE — RAD
HISTORY: Neurological changes



COMPARISONS: March 21, 2017



TECHNIQUE: Multiple contiguous axial CT scans were obtained of the head without 

intravenous contrast. 



FINDINGS: 

HEMORRHAGE/INFARCT: There is no hemorrhage or acute infarct.

MASSES/SHIFT: There is no mass or shift.



EXTRA-AXIAL SPACES: There are no extra-axial fluid collections.

SULCI AND VENTRICLES: The sulci and ventricles are normal in size and position for the

patient's stated age.



CEREBRUM: There is stable right frontal encephalomalacia. There is small focus of

encephalomalacia of the right occipital lobe

BRAINSTEM: There are no focal parenchymal abnormalities.

CEREBELLUM: There are no focal parenchymal abnormalities.



VESSELS: The vessels are grossly normal.

PARANASAL SINUSES: The paranasal sinuses are clear.

ORBITS: The orbits are unremarkable.

BONES AND SOFT TISSUE: No bone or soft tissue abnormalities are noted.



OTHER: None



IMPRESSION: 

EVIDENCE OF REMOTE INFARCT. NO ACUTE INTRACRANIAL PATHOLOGY.

PRELIMINARY FINDINGS WERE DISCUSSED WITH DR. PEREZ BY DR. CONTRERAS AT APPROXIMATELY 2:40 AM ON

DECEMBER 12, 2017.

## 2017-12-12 NOTE — RAD
HISTORY: Neurological changes



COMPARISONS: September 19, 2016



VIEWS: 1: frontal portable view of the chest at 1:55 AM



FINDINGS:

LINES AND TUBES: None.

CARDIOMEDIASTINAL SILHOUETTE: The cardiomediastinal silhouette is normal for portable

technique.

PLEURA: The costophrenic angles are sharp. No pleural abnormalities are noted.

LUNG PARENCHYMA: The lungs are clear.

ABDOMEN: The upper abdomen is clear. There is no subphrenic gas.

BONES AND SOFT TISSUES: No bone or soft tissue abnormalities are noted.



IMPRESSION: NO ACTIVE CARDIOPULMONARY DISEASE.

## 2018-02-08 ENCOUNTER — HOSPITAL ENCOUNTER (EMERGENCY)
Dept: HOSPITAL 25 - ED | Age: 53
Discharge: HOME | End: 2018-02-08
Payer: COMMERCIAL

## 2018-02-08 VITALS — SYSTOLIC BLOOD PRESSURE: 137 MMHG | DIASTOLIC BLOOD PRESSURE: 85 MMHG

## 2018-02-08 DIAGNOSIS — Z88.5: ICD-10-CM

## 2018-02-08 DIAGNOSIS — G43.909: Primary | ICD-10-CM

## 2018-02-08 DIAGNOSIS — Z88.6: ICD-10-CM

## 2018-02-08 DIAGNOSIS — F17.210: ICD-10-CM

## 2018-02-08 DIAGNOSIS — G93.89: ICD-10-CM

## 2018-02-08 DIAGNOSIS — Z86.73: ICD-10-CM

## 2018-02-08 LAB
BASOPHILS # BLD AUTO: 0.1 10^3/UL (ref 0–0.2)
EOSINOPHIL # BLD AUTO: 0.4 10^3/UL (ref 0–0.6)
HCT VFR BLD AUTO: 42 % (ref 35–47)
HGB BLD-MCNC: 14 G/DL (ref 12–16)
LYMPHOCYTES # BLD AUTO: 3.5 10^3/UL (ref 1–4.8)
MCH RBC QN AUTO: 31 PG (ref 27–31)
MCHC RBC AUTO-ENTMCNC: 34 G/DL (ref 31–36)
MCV RBC AUTO: 92 FL (ref 80–97)
MONOCYTES # BLD AUTO: 0.5 10^3/UL (ref 0–0.8)
NEUTROPHILS # BLD AUTO: 3.2 10^3/UL (ref 1.5–7.7)
NRBC # BLD AUTO: 0 10^3/UL
NRBC BLD QL AUTO: 0.1
PLATELET # BLD AUTO: 181 10^3/UL (ref 150–450)
RBC # BLD AUTO: 4.55 10^6/UL (ref 4–5.4)
WBC # BLD AUTO: 7.6 10^3/UL (ref 3.5–10.8)

## 2018-02-08 PROCEDURE — 70450 CT HEAD/BRAIN W/O DYE: CPT

## 2018-02-08 PROCEDURE — 85025 COMPLETE CBC W/AUTO DIFF WBC: CPT

## 2018-02-08 PROCEDURE — 99282 EMERGENCY DEPT VISIT SF MDM: CPT

## 2018-02-08 PROCEDURE — 36415 COLL VENOUS BLD VENIPUNCTURE: CPT

## 2018-02-08 PROCEDURE — 80053 COMPREHEN METABOLIC PANEL: CPT

## 2018-02-08 NOTE — ED
Jc NOEL Jennifer, scribed for Jose Correa MD on 02/08/18 at 1859 .





Headache





- HPI Summary


HPI Summary: 





The patient is a 52 year old female who presents to the ED with migraines that 

began two days ago. The patient reports that she is off-balance, stuttering, 

and has amnesia. She adds that in 2016, she had two back to back strokes. She 

has been in physical therapy for a slow left hand since she had her strokes.





- History Of Current Complaint


Chief Complaint: EDHeadache


Stated Complaint: HEADACHE


Time Seen by Provider: 02/08/18 14:39


Hx Obtained From: Patient


Hx Last Menstrual Period: April 15


Onset/Duration: Started days ago - two days, Still Present


Timing: Constant


Aggravating Factor: Nothing


Allevating Factors: Nothing


Associated Signs And Symptoms: Other (Noted In Comments) - off-balance, 

stuttering, amnesia





- Allergies/Home Medications


Allergies/Adverse Reactions: 


 Allergies











Allergy/AdvReac Type Severity Reaction Status Date / Time


 


MS Ibuprofen [Ibuprofen] Allergy Intermediate Nausea And Verified 11/14/17 12:27





   Vomiting  


 


MS Latex [Latex] Allergy Intermediate Rash Verified 11/14/17 12:27


 


hydrocodone Allergy  GI Upset Verified 02/08/18 14:12


 


bee stings Allergy Severe Swelling Uncoded 11/14/17 12:27





   Of  





   Face,Lips,&  





   Throat  


 


seafood Allergy Severe Difficulty Uncoded 11/14/17 12:27





   Breathing  


 


cocoanut Allergy Intermediate Rash Uncoded 11/14/17 12:27














PMH/Surg Hx/FS Hx/Imm Hx


Endocrine/Hematology History: 


   Denies: Hx Anticoagulant Therapy, Hx Diabetes, Hx Thyroid Disease


Cardiovascular History: Reports: Hx Deep Vein Thrombosis - left leg, Hx 

Hypercholesterolemia, Hx Hypertension, Other Cardiovascular Problems/Disorders 

- hypercholesterolemia, right sided carotid artery stenosis, IVC filter


   Denies: Hx Pacemaker/ICD


Respiratory History: Reports: Hx Asthma - prn inhalers, Hx Chronic Obstructive 

Pulmonary Disease (COPD)


GI History: Reports: Hx Gastroesophageal Reflux Disease


 History: Reports: Hx Acute Renal Failure


   Denies: Hx Renal Disease


Musculoskeletal History: Reports: Hx Arthritis - left knee, Hx Back Problems, 

Other Musculoskeletal History - right thumb foreign body


Sensory History: Reports: Hx Contacts or Glasses - readers


   Denies: Hx Hearing Aid


Opthamlomology History: Reports: Hx Contacts or Glasses - readers


Neurological History: Reports: Hx Migraine, Other Neuro Impairments/Disorders - 

Stroke


   Denies: Hx Dementia, Hx Seizures


Psychiatric History: Reports: Hx Depression


   Denies: Hx Anxiety, Hx Attention Deficit Hyperactivity Disorder, Hx Eating 

Disorder, Hx Panic Disorder, Hx Post Traumatic Stress Disorder, Hx Inpatient 

Treatment, Hx Community Mental Health Tx, Hx Schizophrenia, Hx Bipolar Disorder

, Hx Suicide Attempt - denies SA; says recent OD was a "1x thing" to get the 

attention of wife, Hx of Violent Episodes Against Others, Hx Substance Abuse, 

Other Psychiatric Issues/Disorders





- Surgical History


Surgery Procedure, Year, and Place: carotid endarterectomy [facility] -2016.  [facility] 

placement [facility] - 2016


Hx Anesthesia Reactions: No





- Immunization History


Date of Tetanus Vaccine: 11/6/2017


Infectious Disease History: No


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 

and breast


   Denies: Hx Hepatitis, Hx Human Immunodeficiency Virus (HIV), History Other 

Infectious Disease, Traveled Outside the [place] in Last 30 Days





- Family History


Known Family History: Positive: Cardiac Disease - CHF - MOM AND DAD, Other - 

Father CVA


Family History: FHx of CVA





- Social History


Alcohol Use: None


Alcohol Amount: reports last had alcohol many years ago


Hx Substance Use: No


Substance Use Type: Reports: None


Substance Use Comment - Amount & Last Used: cigarettes; trying to quit


Hx Tobacco Use: Yes


Smoking Status (MU): Light Every Day Tobacco Smoker


Type: Cigarettes


Amount Used/How Often: 1 1/2 ppd for 30 years, reports only 4 cigs per day now


Length of Time of Smoking/Using Tobacco: 37 years


Have You Smoked in the Last Year: Yes





Review of Systems


Negative: Fever


Neurological: Other - Migraine, off-balance, stuttering, amnesia


All Other Systems Reviewed And Are Negative: Yes





Physical Exam





- Summary


Physical Exam Summary: 





Appearance: Well-appearing, Well-nourished


Skin: Warm, Dry, No rash


Eyes: Normal, PERRL, EOMI, sclera anicteric


ENT: Normal


Neck: Supple, nontender


Respiratory: Clear to auscultation


Cardiovascular: S1, S2, no murmur, no rub, no gallop


Abdomen: Soft, nontender, no organomegaly


Bowel sounds: Present


Musculoskeletal: Normal, Strength/ROM Intact, no edema, pulses symmetrical


Neurological: Left-sided weakness in arm and leg, difficulty with speech and 

word finding but no change from previously, A&Ox3, cranial nerves II-XII WNL, 

follows commands, gait not tested, sensation intact to pin and light touch


Psychiatric: affect normal, behavior appropriate, dressed appropriately, 

judgment intact


Triage Information Reviewed: Yes


Vital Signs On Initial Exam: 


 Initial Vitals











Temp Pulse Resp BP Pulse Ox


 


 98.5 F   60   17   148/89   99 


 


 02/08/18 14:07  02/08/18 14:07  02/08/18 14:07  02/08/18 14:07  02/08/18 14:07











Vital Signs Reviewed: Yes





Diagnostics





- Vital Signs


 Vital Signs











  Temp Pulse Resp BP Pulse Ox


 


 02/08/18 14:07  98.5 F  60  17  148/89  99














- Laboratory


Result Diagrams: 


 02/08/18 19:22





 02/08/18 19:22


Lab Statement: Any lab studies that have been ordered have been reviewed, and 

results considered in the medical decision making process.





- CT


  ** CT Brain


CT Interpretation: No Acute Changes - 1.  STABLE RIGHT FRONTAL AND RIGHT 

OCCIPITAL ENCEPHALOMALACIA CONSISTENT WITH PREVIOUS STROKE. 2.  NO ACUTE 

INTRACRANIAL PATHOLOGY. Dr. Correa has reviewed this report.


CT Interpretation Completed By: Radiologist





Headache Course/Dx





- Course


Assessment/Plan: The patient is a 52 year old female who presents to the ED 

with migraines that began two days ago. In the ED course the patient was given 

Morphine. Bloodwork was obtained. Brain CT shows 1.  STABLE RIGHT FRONTAL AND 

RIGHT OCCIPITAL ENCEPHALOMALACIA CONSISTENT WITH PREVIOUS STROKE. 2.  NO ACUTE 

INTRACRANIAL PATHOLOGY. The patient is diagnosed with migraine. The patient is 

instructed to follow up with primary care.





- Diagnoses


Provider Diagnoses: 


 Migraine








Discharge





- Discharge Plan


Condition: Stable


Disposition: HOME


Referrals: 


Tyrese Barragan MD [Primary Care Provider] - 


Additional Instructions: 


RETURN TO THE EMERGENCY DEPARTMENT FOR CHANGING OR WORSENING SYMPTOMS.





The documentation as recorded by the Jc linares Jennifer accurately reflects 

the service I personally performed and the decisions made by me, Jose Correa MD.

## 2018-02-08 NOTE — RAD
HISTORY: Headache



COMPARISONS: December 12, 2017



TECHNIQUE: Multiple contiguous axial CT scans were obtained of the head without 

intravenous contrast. 



FINDINGS: 

HEMORRHAGE/INFARCT: There is no hemorrhage or acute infarct.

MASSES/SHIFT: There is no mass or shift.



EXTRA-AXIAL SPACES: There are no extra-axial fluid collections.

SULCI AND VENTRICLES: The sulci and ventricles are normal in size and position for the

patient's stated age.



CEREBRUM: There is stable right frontal and right occipital encephalomalacia.

BRAINSTEM: There are no focal parenchymal abnormalities.

CEREBELLUM: There are no focal parenchymal abnormalities.



VESSELS: The vessels are grossly normal.

PARANASAL SINUSES: The paranasal sinuses are clear.

ORBITS: The orbits are unremarkable.

BONES AND SOFT TISSUE: No bone or soft tissue abnormalities are noted.



OTHER: None



IMPRESSION: 

1.  STABLE RIGHT FRONTAL AND RIGHT OCCIPITAL ENCEPHALOMALACIA CONSISTENT WITH PREVIOUS

STROKE.

2.  NO ACUTE INTRACRANIAL PATHOLOGY

## 2018-06-08 ENCOUNTER — HOSPITAL ENCOUNTER (EMERGENCY)
Dept: HOSPITAL 25 - UCCORT | Age: 53
Discharge: HOME | End: 2018-06-08
Payer: COMMERCIAL

## 2018-06-08 VITALS — SYSTOLIC BLOOD PRESSURE: 141 MMHG | DIASTOLIC BLOOD PRESSURE: 68 MMHG

## 2018-06-08 DIAGNOSIS — M79.602: ICD-10-CM

## 2018-06-08 DIAGNOSIS — Z88.6: ICD-10-CM

## 2018-06-08 DIAGNOSIS — Z86.73: ICD-10-CM

## 2018-06-08 DIAGNOSIS — Z91.018: ICD-10-CM

## 2018-06-08 DIAGNOSIS — Z91.030: ICD-10-CM

## 2018-06-08 DIAGNOSIS — Z88.5: ICD-10-CM

## 2018-06-08 DIAGNOSIS — I10: ICD-10-CM

## 2018-06-08 DIAGNOSIS — Z91.040: ICD-10-CM

## 2018-06-08 DIAGNOSIS — R60.0: ICD-10-CM

## 2018-06-08 DIAGNOSIS — M25.542: ICD-10-CM

## 2018-06-08 DIAGNOSIS — M79.89: Primary | ICD-10-CM

## 2018-06-08 DIAGNOSIS — Z91.013: ICD-10-CM

## 2018-06-08 DIAGNOSIS — M79.1: ICD-10-CM

## 2018-06-08 DIAGNOSIS — F17.210: ICD-10-CM

## 2018-06-08 PROCEDURE — 99212 OFFICE O/P EST SF 10 MIN: CPT

## 2018-06-08 PROCEDURE — G0463 HOSPITAL OUTPT CLINIC VISIT: HCPCS

## 2018-06-08 NOTE — UC
Upper Extremity HPI





- HPI Summary


HPI Summary: 


This nice lady comes in to the urgent care today with chief complaint of left 

arm pain and swelling.  She has had no known injury.  She has had an embolic 

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 patient does continue to smoke 

cigarettes








- History of Current Complaint


Hx Obtained From: Patient


Pregnant?: No


Onset/Duration: Sudden Onset


Pain Intensity: 7


Pain Scale Used: 0-10 Numeric


Location Of Pain: Is Discrete @


Character: Throbbing - left upper arm


Aggravating Factor(s): Nothing


Alleviating Factor(s): Nothing


Associated Signs And Symptoms: Positive: Swelling - left upper arm


Related History: Dominant Hand Right





<Barbi Lewis - Last Filed: 06/08/18 19:03>





<Tonia Prince - Last Filed: 06/08/18 19:25>





- History of Current Complaint


Chief Complaint: UCUpperExtremity


Stated Complaint: LEFT ARM INJURY


Time Seen by Provider: 06/08/18 18:23





- Allergies/Home Medications


Allergies/Adverse Reactions: 


 Allergies











Allergy/AdvReac Type Severity Reaction Status Date / Time


 


MS Ibuprofen [Ibuprofen] Allergy Intermediate Nausea And Verified 06/08/18 18:15





   Vomiting  


 


MS Latex [Latex] Allergy Intermediate Rash Verified 06/08/18 18:15


 


hydrocodone Allergy  GI Upset Verified 06/08/18 18:15


 


bee stings Allergy Severe Swelling Uncoded 06/08/18 18:15





   Of  





   Face,Lips,&  





   Throat  


 


seafood Allergy Severe Difficulty Uncoded 06/08/18 18:15





   Breathing  


 


cocoanut Allergy Intermediate Rash Uncoded 06/08/18 18:15














PMH/Surg Hx/FS Hx/Imm Hx


Previously Healthy: No


Endocrine History: Dyslipidemia


Cardiovascular History: Hypertension


Respiratory History: Asthma


Neurological History: CVA


Other History Of: 


   Negative For: Anticoagulant Therapy





- Surgical History


Surgical History: None


Surgery Procedure, Year, and Place: carotid endarterectomy [facility] -2016.  [facility] 

placement [facility] - 2016





- Family History


Known Family History: Positive: Cardiac Disease - CHF - MOM AND DAD, Other - 

Father CVA


Family History: FHx of CVA





- Social History


Occupation: Disabled


Lives: With Family


Alcohol Use: None


Alcohol Amount: reports last had alcohol many years ago


Substance Use Type: None


Substance Use Comment - Amount & Last Used: cigarettes; trying to quit


Smoking Status (MU): Light Every Day Tobacco Smoker


Type: Cigarettes


Amount Used/How Often: 1 1/2 ppd for 30 years, reports only 4 cigs per day now


Length of Time of Smoking/Using Tobacco: 37 years


Have You Smoked in the Last Year: Yes


Household Exposure Type: Cigarettes





- Immunization History


Most Recent Influenza Vaccination: none


Most Recent Tetanus Shot: unknown


Most Recent Pneumonia Vaccination: 2014





<Barbi Lewis - Last Filed: 06/08/18 19:03>





Review of Systems


Constitutional: Negative


Skin: Negative


Eyes: Negative


ENT: Negative


Respiratory: Negative


Cardiovascular: Negative


Gastrointestinal: Negative


Genitourinary: Negative


Motor: Negative


Neurovascular: Negative


Musculoskeletal: Arthralgia - left upper arm, Edema - left arm and hand, 

Myalgia - left upper arm


Neurological: Negative


Psychological: Negative


Is Patient Immunocompromised?: No


All Other Systems Reviewed And Are Negative: Yes





<Barbi Lewis - Last Filed: 06/08/18 19:03>





Physical Exam


Triage Information Reviewed: Yes


Appearance: Well-Appearing, No Pain Distress, Well-Nourished


Vital Signs: 


 Initial Vital Signs











Temp  98.2 F   06/08/18 18:17


 


Pulse  71   06/08/18 18:17


 


Resp  18   06/08/18 18:17


 


BP  141/68   06/08/18 18:17


 


Pulse Ox  98   06/08/18 18:17











Vital Signs Reviewed: Yes


Eye Exam: Normal


Eyes: Positive: Conjunctiva Clear


ENT Exam: Normal


ENT: Positive: Normal ENT inspection, Hearing grossly normal, Pharynx normal.  

Negative: Trismus, Muffled voice, Hoarse voice


Neck exam: Normal


Neck: Positive: Supple, Nontender, No Lymphadenopathy


Respiratory Exam: Normal


Respiratory: Positive: No respiratory distress, No accessory muscle use


Cardiovascular Exam: Normal


Cardiovascular: Positive: RRR, Pulses Normal, Brisk Capillary Refill


Musculoskeletal Exam: Other


Musculoskeletal: Positive: Strength Limited @ - secondary to cva, Edema @ - 

left arm


Neurological Exam: Normal


Neurological: Positive: Alert, Muscle Tone Normal


Psychological Exam: Normal


Skin Exam: Normal





<Barbi Lewis - Last Filed: 06/08/18 19:03>


Vital Signs: 


 Initial Vital Signs











Temp  98.2 F   06/08/18 18:17


 


Pulse  71   06/08/18 18:17


 


Resp  18   06/08/18 18:17


 


BP  141/68   06/08/18 18:17


 


Pulse Ox  98   06/08/18 18:17














<Tonia Prince - Last Filed: 06/08/18 19:25>





Upper Extremity Course/Dx





- Course


Course Of Treatment: d/c from urgent care to go to emergency department at 

[facility] for further evaluation of swelling in immobile 

arm





- Differential Dx/Diagnosis


Provider Diagnoses: left arm swelling





- Physician Notification/Consults


Discussed Patient Care With: Mohsen Redman MD


Instructed by Provider To: Transfer





<Barbi Lewis - Last Filed: 06/08/18 19:03>





Discharge





- Sign-Out/Discharge


Documenting (check all that apply): Post-Discharge Follow Up





- Billing Disposition and Condition


Condition: STABLE


Disposition: Home





<Barbi Lewis - Last Filed: 06/08/18 19:03>





- Billing Disposition and Condition


Condition: STABLE


Disposition: Home





<Tonia Prince - Last Filed: 06/08/18 19:25>





- Discharge Plan


Condition: Stable


Disposition: HOME


Patient Education Materials:  Hypertension (ED)


Referrals: 


Tyrese Barragan MD [Primary Care Provider] - 


Additional Instructions: 


We are going to discharge you from the urgent care to go directly to the 

emergency department at [facility] for further evaluation of the 

pain and swelling in your left arm





Attestation Statement


User Type: Provider - I was available for consult. This patient was seen by the 

HEIDI. The patient was not presented to, seen by, or examined by me. -Elj





<Tonia Prince - Last Filed: 06/08/18 19:25>